# Patient Record
Sex: MALE | Race: BLACK OR AFRICAN AMERICAN | NOT HISPANIC OR LATINO | Employment: PART TIME | URBAN - METROPOLITAN AREA
[De-identification: names, ages, dates, MRNs, and addresses within clinical notes are randomized per-mention and may not be internally consistent; named-entity substitution may affect disease eponyms.]

---

## 2020-06-25 PROBLEM — F25.0 SCHIZOAFFECTIVE DISORDER, BIPOLAR TYPE: Status: ACTIVE | Noted: 2018-07-11

## 2020-10-17 PROBLEM — T24.211A: Status: ACTIVE | Noted: 2020-10-17

## 2020-10-17 PROBLEM — T23.201A: Status: ACTIVE | Noted: 2020-10-17

## 2020-10-17 PROBLEM — T21.22XA SECOND DEGREE BURN OF ABDOMEN, INITIAL ENCOUNTER: Status: ACTIVE | Noted: 2020-10-17

## 2020-10-17 PROBLEM — T24.231A: Status: ACTIVE | Noted: 2020-10-17

## 2020-10-17 PROBLEM — T23.231A: Status: ACTIVE | Noted: 2020-10-17

## 2020-10-17 PROBLEM — T31.20 BURN (ANY DEGREE) INVOLVING 20-29% OF BODY SURFACE: Status: ACTIVE | Noted: 2020-10-17

## 2020-10-17 PROBLEM — T22.292A: Status: ACTIVE | Noted: 2020-10-17

## 2020-10-17 PROBLEM — T23.232A: Status: ACTIVE | Noted: 2020-10-17

## 2021-07-01 ENCOUNTER — PATIENT MESSAGE (OUTPATIENT)
Dept: ADMINISTRATIVE | Facility: OTHER | Age: 41
End: 2021-07-01

## 2024-10-30 ENCOUNTER — PATIENT MESSAGE (OUTPATIENT)
Dept: RESEARCH | Facility: HOSPITAL | Age: 44
End: 2024-10-30

## 2025-01-06 ENCOUNTER — PATIENT OUTREACH (OUTPATIENT)
Dept: ADMINISTRATIVE | Facility: OTHER | Age: 45
End: 2025-01-06

## 2025-01-06 NOTE — PROGRESS NOTES
CHW - Initial Contact    This Community Health Worker completed the Social Determinant of Health questionnaire with patient during clinic visit today.    Pt identified barriers of most importance are: patient identified no barriers of importance during clinic visit    Referrals to community agencies completed with patient consent outside of [facility] include: No community referral needed during clinic visit   Referrals were put through [facility] - no:   Support and Services: No support services needed during clinic visit   Other information discussed the patient needs  help with: patient discussed no other information during clinic visit    Follow up required: yes  No future outreach task assigned

## 2025-02-24 ENCOUNTER — PATIENT OUTREACH (OUTPATIENT)
Dept: ADMINISTRATIVE | Facility: OTHER | Age: 45
End: 2025-02-24

## 2025-02-24 NOTE — PROGRESS NOTES
CHW - Follow Up    This Community Health Worker completed a follow up visit with patient via telephone today.  Pt/Caregiver reported: patient caregiver reported he is doing fine no assistance is needed during follow up phone interview.   Caregiver will reach out if patient needs assistance in the future.    Community Health Worker provided: patient caregiver reported he is doing fine   Follow up required: No  Case Closure - Due: 2/24/2025             CHW - Case Closure    This Community Health Worker spoke to patient via telephone today.   Pt/Caregiver reported: patient caregiver reported he is doing fine no assistance is needed during follow up phone interview.   Caregiver will reach out if patient needs assistance in the future.    Pt/Caregiver denied any additional needs at this time and agrees with episode closure at this time.    Provided caregiver with Community Health Worker's contact information and encouraged   him/her to contact this Community Health Worker if additional needs arise.